# Patient Record
Sex: MALE | Race: WHITE
[De-identification: names, ages, dates, MRNs, and addresses within clinical notes are randomized per-mention and may not be internally consistent; named-entity substitution may affect disease eponyms.]

---

## 2020-04-03 ENCOUNTER — APPOINTMENT (OUTPATIENT)
Dept: PEDIATRICS | Facility: CLINIC | Age: 20
End: 2020-04-03
Payer: COMMERCIAL

## 2020-04-03 DIAGNOSIS — J06.9 ACUTE UPPER RESPIRATORY INFECTION, UNSPECIFIED: ICD-10-CM

## 2020-04-03 DIAGNOSIS — Z20.828 CONTACT WITH AND (SUSPECTED) EXPOSURE TO OTHER VIRAL COMMUNICABLE DISEASES: ICD-10-CM

## 2020-04-03 PROBLEM — Z00.00 ENCOUNTER FOR PREVENTIVE HEALTH EXAMINATION: Status: ACTIVE | Noted: 2020-04-03

## 2020-04-03 PROCEDURE — 99213 OFFICE O/P EST LOW 20 MIN: CPT

## 2020-04-03 NOTE — DISCUSSION/SUMMARY
[FreeTextEntry1] : Symptoms likely due to viral URI. Recommend supportive care including antipyretics, fluids, and nasal saline followed by nasal suction. Return if symptoms worsen or persist.\par \par Monitor for fever, worsening cough, sob-- needs reeval.

## 2020-04-03 NOTE — HISTORY OF PRESENT ILLNESS
[de-identified] : Pt's dad tested positive for Corona [FreeTextEntry6] : No fever\par No Sore throat, \par Cough, runny nose, nasal congestion- mild. No SOB. No h/o asthma.\par No vomiting, no diarrhea, normal appetite\par No headache, no dizziness\par No wheezing, no SOB, no dysphagia\par

## 2020-12-23 PROBLEM — J06.9 ACUTE URI: Status: RESOLVED | Noted: 2020-04-03 | Resolved: 2020-12-23

## 2022-12-15 ENCOUNTER — OFFICE (OUTPATIENT)
Dept: URBAN - METROPOLITAN AREA CLINIC 94 | Facility: CLINIC | Age: 22
Setting detail: OPHTHALMOLOGY
End: 2022-12-15
Payer: COMMERCIAL

## 2022-12-15 DIAGNOSIS — H01.011: ICD-10-CM

## 2022-12-15 DIAGNOSIS — H01.014: ICD-10-CM

## 2022-12-15 DIAGNOSIS — H11.442: ICD-10-CM

## 2022-12-15 PROCEDURE — 92012 INTRM OPH EXAM EST PATIENT: CPT | Performed by: OPHTHALMOLOGY

## 2022-12-15 ASSESSMENT — REFRACTION_CURRENTRX
OS_OVR_VA: 20/
OS_VPRISM_DIRECTION: SV
OD_OVR_VA: 20/
OS_VPRISM_DIRECTION: SV
OD_SPHERE: -3.25
OS_AXIS: 030
OS_SPHERE: -2.75
OD_AXIS: 004
OS_SPHERE: -2.25
OS_CYLINDER: -0.50
OS_CYLINDER: -0.25
OD_SPHERE: -2.25
OS_CYLINDER: 0.00
OD_CYLINDER: -0.50
OD_CYLINDER: -0.50
OD_OVR_VA: 20/
OD_AXIS: 001
OD_CYLINDER: -0.50
OD_VPRISM_DIRECTION: SV
OS_AXIS: 012
OD_OVR_VA: 20/
OS_AXIS: 000
OS_SPHERE: -3.00
OD_SPHERE: -2.50
OS_OVR_VA: 20/
OD_AXIS: 010
OD_VPRISM_DIRECTION: SV
OS_OVR_VA: 20/

## 2022-12-15 ASSESSMENT — AXIALLENGTH_DERIVED
OS_AL: 25.7301
OD_AL: 25.5673
OD_AL: 25.5673
OS_AL: 25.7881

## 2022-12-15 ASSESSMENT — REFRACTION_MANIFEST
OS_CYLINDER: -0.50
OD_VA1: 20/20
OD_SPHERE: -3.25
OS_VA1: 20/20
OD_CYLINDER: -0.50
OD_AXIS: 05
OS_SPHERE: -3.25
OS_AXIS: 10

## 2022-12-15 ASSESSMENT — KERATOMETRY
OS_AXISANGLE_DEGREES: 094
OD_K2POWER_DIOPTERS: 43.25
OD_K1POWER_DIOPTERS: 41.25
OS_K1POWER_DIOPTERS: 41.25
OD_AXISANGLE_DEGREES: 088
OS_K2POWER_DIOPTERS: 42.50

## 2022-12-15 ASSESSMENT — TONOMETRY: OD_IOP_MMHG: 20

## 2022-12-15 ASSESSMENT — REFRACTION_AUTOREFRACTION
OS_SPHERE: -3.25
OD_SPHERE: -3.00
OD_AXIS: 003
OD_CYLINDER: -1.00
OS_AXIS: 008
OS_CYLINDER: -0.75

## 2022-12-15 ASSESSMENT — SPHEQUIV_DERIVED
OD_SPHEQUIV: -3.5
OS_SPHEQUIV: -3.5
OS_SPHEQUIV: -3.625
OD_SPHEQUIV: -3.5

## 2022-12-15 ASSESSMENT — VISUAL ACUITY
OD_BCVA: 20/20-1
OS_BCVA: 20/20-1

## 2022-12-15 ASSESSMENT — CONFRONTATIONAL VISUAL FIELD TEST (CVF)
OD_FINDINGS: FULL
OS_FINDINGS: FULL